# Patient Record
Sex: FEMALE | Race: WHITE
[De-identification: names, ages, dates, MRNs, and addresses within clinical notes are randomized per-mention and may not be internally consistent; named-entity substitution may affect disease eponyms.]

---

## 2021-11-20 ENCOUNTER — HOSPITAL ENCOUNTER (INPATIENT)
Dept: HOSPITAL 79 - MED SURG 4 | Age: 81
LOS: 12 days | DRG: 682 | End: 2021-12-02
Attending: INTERNAL MEDICINE | Admitting: EMERGENCY MEDICINE
Payer: MEDICARE

## 2021-11-20 VITALS — WEIGHT: 162.13 LBS | HEIGHT: 63 IN | BODY MASS INDEX: 28.73 KG/M2

## 2021-11-20 DIAGNOSIS — Z95.1: ICD-10-CM

## 2021-11-20 DIAGNOSIS — I35.0: ICD-10-CM

## 2021-11-20 DIAGNOSIS — I50.32: ICD-10-CM

## 2021-11-20 DIAGNOSIS — N17.0: Primary | ICD-10-CM

## 2021-11-20 DIAGNOSIS — R13.12: ICD-10-CM

## 2021-11-20 DIAGNOSIS — Z82.49: ICD-10-CM

## 2021-11-20 DIAGNOSIS — D50.9: ICD-10-CM

## 2021-11-20 DIAGNOSIS — I48.0: ICD-10-CM

## 2021-11-20 DIAGNOSIS — E87.6: ICD-10-CM

## 2021-11-20 DIAGNOSIS — E03.9: ICD-10-CM

## 2021-11-20 DIAGNOSIS — N39.0: ICD-10-CM

## 2021-11-20 DIAGNOSIS — I25.10: ICD-10-CM

## 2021-11-20 DIAGNOSIS — E87.2: ICD-10-CM

## 2021-11-20 DIAGNOSIS — R53.2: ICD-10-CM

## 2021-11-20 DIAGNOSIS — Z86.16: ICD-10-CM

## 2021-11-20 DIAGNOSIS — Z66: ICD-10-CM

## 2021-11-20 DIAGNOSIS — E86.0: ICD-10-CM

## 2021-11-20 DIAGNOSIS — G93.41: ICD-10-CM

## 2021-11-20 DIAGNOSIS — E87.0: ICD-10-CM

## 2021-11-20 DIAGNOSIS — F02.80: ICD-10-CM

## 2021-11-20 DIAGNOSIS — B37.89: ICD-10-CM

## 2021-11-20 DIAGNOSIS — E78.5: ICD-10-CM

## 2021-11-20 DIAGNOSIS — Z20.822: ICD-10-CM

## 2021-11-20 DIAGNOSIS — L89.152: ICD-10-CM

## 2021-11-20 DIAGNOSIS — E16.2: ICD-10-CM

## 2021-11-20 DIAGNOSIS — I13.0: ICD-10-CM

## 2021-11-20 DIAGNOSIS — E83.51: ICD-10-CM

## 2021-11-20 DIAGNOSIS — N18.30: ICD-10-CM

## 2021-11-20 DIAGNOSIS — G30.9: ICD-10-CM

## 2021-11-20 DIAGNOSIS — M17.0: ICD-10-CM

## 2021-11-20 DIAGNOSIS — E43: ICD-10-CM

## 2021-11-20 PROCEDURE — P9047 ALBUMIN (HUMAN), 25%, 50ML: HCPCS

## 2021-11-21 LAB
BUN/CREATININE RATIO: 11 (ref 0–10)
BUN/CREATININE RATIO: 11 (ref 0–10)
HGB BLD-MCNC: 9.4 GM/DL (ref 12.3–15.3)
RED BLOOD COUNT: 3.12 M/UL (ref 4–5.1)
WHITE BLOOD COUNT: 9 K/UL (ref 4.5–11)

## 2021-11-22 LAB
BUN/CREATININE RATIO: 10 (ref 0–10)
HGB BLD-MCNC: 8.5 GM/DL (ref 12.3–15.3)
RED BLOOD COUNT: 2.8 M/UL (ref 4–5.1)
WHITE BLOOD COUNT: 7 K/UL (ref 4.5–11)

## 2021-11-23 LAB
BUN/CREATININE RATIO: 10 (ref 0–10)
HGB BLD-MCNC: 9.4 GM/DL (ref 12.3–15.3)
RED BLOOD COUNT: 3.13 M/UL (ref 4–5.1)
WHITE BLOOD COUNT: 8.6 K/UL (ref 4.5–11)

## 2021-11-24 LAB
BUN/CREATININE RATIO: 10 (ref 0–10)
HGB BLD-MCNC: 10.2 GM/DL (ref 12.3–15.3)
RED BLOOD COUNT: 3.41 M/UL (ref 4–5.1)
WHITE BLOOD COUNT: 6.8 K/UL (ref 4.5–11)

## 2021-11-25 LAB
BUN/CREATININE RATIO: 10 (ref 0–10)
HGB BLD-MCNC: 10 GM/DL (ref 12.3–15.3)
RED BLOOD COUNT: 3.29 M/UL (ref 4–5.1)
WHITE BLOOD COUNT: 8.3 K/UL (ref 4.5–11)

## 2021-11-26 LAB
BUN/CREATININE RATIO: 10 (ref 0–10)
HGB BLD-MCNC: 9.9 GM/DL (ref 12.3–15.3)
RED BLOOD COUNT: 3.32 M/UL (ref 4–5.1)
WHITE BLOOD COUNT: 8.8 K/UL (ref 4.5–11)

## 2021-11-27 LAB
BUN/CREATININE RATIO: 10 (ref 0–10)
HGB BLD-MCNC: 9.1 GM/DL (ref 12.3–15.3)
RED BLOOD COUNT: 2.99 M/UL (ref 4–5.1)
VITAMIN D, 25-HYDROXY: 38 NG/ML (ref 30–100)
WHITE BLOOD COUNT: 7.2 K/UL (ref 4.5–11)

## 2021-11-28 LAB
BUN/CREATININE RATIO: 10 (ref 0–10)
HGB BLD-MCNC: 9.4 GM/DL (ref 12.3–15.3)
RED BLOOD COUNT: 3.17 M/UL (ref 4–5.1)
WHITE BLOOD COUNT: 7.2 K/UL (ref 4.5–11)

## 2021-11-29 LAB
BUN/CREATININE RATIO: 10 (ref 0–10)
HGB BLD-MCNC: 7.5 GM/DL (ref 12.3–15.3)
RED BLOOD COUNT: 2.56 M/UL (ref 4–5.1)
WHITE BLOOD COUNT: 6 K/UL (ref 4.5–11)

## 2021-11-30 LAB
BUN/CREATININE RATIO: 10 (ref 0–10)
HGB BLD-MCNC: 8.9 GM/DL (ref 12.3–15.3)
RED BLOOD COUNT: 2.99 M/UL (ref 4–5.1)
WHITE BLOOD COUNT: 6.6 K/UL (ref 4.5–11)

## 2021-11-30 NOTE — NUR
WOUND CARE AND PERINEAL CARE DONE. PT TOLERATED WELL. NOTED TO HAVE SMALL
ABRASION, BRUISE TO RIGHT UPPER THIGH. ALLYVEN PLACED OVER SITE FOR
PROTECTION.  OLD SKIN TEAR TO RIGHT FOREARM NOTED TO BE HEALED.

## 2021-12-01 LAB
BUN/CREATININE RATIO: 11 (ref 0–10)
HGB BLD-MCNC: 8.7 GM/DL (ref 12.3–15.3)
RED BLOOD COUNT: 2.89 M/UL (ref 4–5.1)
WHITE BLOOD COUNT: 7.3 K/UL (ref 4.5–11)